# Patient Record
Sex: FEMALE | Race: WHITE
[De-identification: names, ages, dates, MRNs, and addresses within clinical notes are randomized per-mention and may not be internally consistent; named-entity substitution may affect disease eponyms.]

---

## 2017-03-03 ENCOUNTER — HOSPITAL ENCOUNTER (EMERGENCY)
Dept: HOSPITAL 2 - ER | Age: 27
Discharge: HOME | End: 2017-03-03
Payer: COMMERCIAL

## 2017-03-03 DIAGNOSIS — R11.2: Primary | ICD-10-CM

## 2017-03-03 DIAGNOSIS — R19.7: ICD-10-CM

## 2017-03-03 DIAGNOSIS — Z88.1: ICD-10-CM

## 2017-03-03 DIAGNOSIS — Z79.3: ICD-10-CM

## 2017-03-03 LAB
BASO #: 0 10_X3_UL (ref 0–0.1)
BASO %: 0.5 % (ref 0.1–1.2)
BLOOD UREA NITROGEN: 15 MG/DL (ref 7–18)
CALCIUM: 8.8 MG/DL (ref 8.7–10.7)
CARBON DIOXIDE: 23 MMOL/L (ref 21–32)
CREATININE: 0.6 MG/DL (ref 0.6–1.3)
EOS #: 0.1 10_X3_UL (ref 0–0.4)
EOS %: 0.6 % (ref 0.7–5.8)
GLUCOSE,RANDOM: 85 MG/DL (ref 70–99)
GRAN #: 5.4 10_X3_UL (ref 1.6–6.1)
GRAN %: 62.4 % (ref 34–71.1)
HEMATOCRIT: 36.8 % (ref 34–45)
HEMOGLOBIN: 12.3 G/DL (ref 11.2–15.7)
LYMPH #: 2.6 10_X3_UL (ref 1.2–3.7)
LYMPH %: 30.7 % (ref 19.3–51.7)
MEAN CORPUSCULAR HEMOGLOBIN: 27.1 PG (ref 27–33)
MEAN CORPUSCULAR HGB CONC: 33.4 G/DL (ref 32–36)
MEAN CORPUSCULAR VOLUME: 81.1 FL (ref 79–95)
MEAN PLATELET VOLUME: 10.5 FL (ref 7.5–11.5)
MONO #: 0.5 10_X3_UL (ref 0.2–0.9)
MONO %: 5.8 % (ref 4.7–12.5)
PLATELET COUNT: 299 X10_3/UL (ref 182–369)
POTASSIUM: 5.2 MMOL/L (ref 3.5–5.1)
RED BLOOD COUNT: 4.54 X10_6/UL (ref 3.9–5.2)
RED CELL DISTRIBUTION WIDTH: 13.7 % (ref 11.7–14.4)
SODIUM: 139 MMOL/L (ref 136–145)
WHITE BLOOD COUNT: 8.6 X10_3/UL (ref 4–10)

## 2017-08-25 ENCOUNTER — OUTSIDE FACILITY SERVICE (OUTPATIENT)
Dept: GASTROENTEROLOGY | Facility: CLINIC | Age: 27
End: 2017-08-25

## 2017-08-25 ENCOUNTER — LAB REQUISITION (OUTPATIENT)
Dept: LAB | Facility: HOSPITAL | Age: 27
End: 2017-08-25

## 2017-08-25 DIAGNOSIS — D64.9 ANEMIA: ICD-10-CM

## 2017-08-25 PROCEDURE — 43239 EGD BIOPSY SINGLE/MULTIPLE: CPT | Performed by: INTERNAL MEDICINE

## 2017-08-25 PROCEDURE — 88305 TISSUE EXAM BY PATHOLOGIST: CPT | Performed by: INTERNAL MEDICINE

## 2017-08-25 PROCEDURE — 45330 DIAGNOSTIC SIGMOIDOSCOPY: CPT | Performed by: INTERNAL MEDICINE

## 2017-08-28 LAB
CYTO UR: NORMAL
LAB AP CASE REPORT: NORMAL
LAB AP CLINICAL INFORMATION: NORMAL
Lab: NORMAL
PATH REPORT.FINAL DX SPEC: NORMAL
PATH REPORT.GROSS SPEC: NORMAL

## 2017-08-29 ENCOUNTER — TELEPHONE (OUTPATIENT)
Dept: GASTROENTEROLOGY | Facility: CLINIC | Age: 27
End: 2017-08-29

## 2017-08-29 NOTE — TELEPHONE ENCOUNTER
----- Message from Giovanni Hewitt MD sent at 8/29/2017  8:36 AM EDT -----  Hello,  Please inform patient that the biopsies showed some inflammation (swelling) in the lining of her stomach.  Advise to avoid NSAIDS (ibuprofen, aleve, motrin, high dose aspirin) and to take a proton pump inhibitor such as omeprazole/pantoprazole 30 minutes before breakfast for at least 6 weeks.  Tylenol at a dose up to 2500 mg a day (4-5 extra strength tylenol) are ok to use for pain.    Thank you,    Dr. Hewitt

## 2017-08-30 ENCOUNTER — TELEPHONE (OUTPATIENT)
Dept: GASTROENTEROLOGY | Facility: CLINIC | Age: 27
End: 2017-08-30

## 2017-08-30 NOTE — TELEPHONE ENCOUNTER
----- Message from Giovanni eHwitt MD sent at 8/29/2017  8:36 AM EDT -----  Hello,  Please inform patient that the biopsies showed some inflammation (swelling) in the lining of her stomach.  Advise to avoid NSAIDS (ibuprofen, aleve, motrin, high dose aspirin) and to take a proton pump inhibitor such as omeprazole/pantoprazole 30 minutes before breakfast for at least 6 weeks.  Tylenol at a dose up to 2500 mg a day (4-5 extra strength tylenol) are ok to use for pain.    Thank you,    Dr. Hewitt